# Patient Record
Sex: MALE | Race: WHITE | NOT HISPANIC OR LATINO | Employment: FULL TIME | URBAN - METROPOLITAN AREA
[De-identification: names, ages, dates, MRNs, and addresses within clinical notes are randomized per-mention and may not be internally consistent; named-entity substitution may affect disease eponyms.]

---

## 2017-03-14 ENCOUNTER — HOSPITAL ENCOUNTER (OUTPATIENT)
Dept: RADIOLOGY | Facility: CLINIC | Age: 47
Discharge: HOME | End: 2017-03-14
Attending: PODIATRIST
Payer: OTHER GOVERNMENT

## 2017-03-14 ENCOUNTER — OFFICE VISIT (OUTPATIENT)
Dept: PODIATRY | Facility: CLINIC | Age: 47
End: 2017-03-14
Payer: OTHER GOVERNMENT

## 2017-03-14 VITALS — BODY MASS INDEX: 38.05 KG/M2 | WEIGHT: 228.38 LBS | HEIGHT: 65 IN

## 2017-03-14 DIAGNOSIS — M79.672 FOOT PAIN, LEFT: Primary | ICD-10-CM

## 2017-03-14 DIAGNOSIS — M79.672 FOOT PAIN, LEFT: ICD-10-CM

## 2017-03-14 PROCEDURE — 99999 PR PBB SHADOW E&M-EST. PATIENT-LVL III: CPT | Mod: PBBFAC,,, | Performed by: PODIATRIST

## 2017-03-14 PROCEDURE — 99214 OFFICE O/P EST MOD 30 MIN: CPT | Mod: S$PBB,,, | Performed by: PODIATRIST

## 2017-03-14 PROCEDURE — 99213 OFFICE O/P EST LOW 20 MIN: CPT | Mod: PBBFAC,PO | Performed by: PODIATRIST

## 2017-03-14 PROCEDURE — 73630 X-RAY EXAM OF FOOT: CPT | Mod: TC,PO,LT

## 2017-03-14 PROCEDURE — 73630 X-RAY EXAM OF FOOT: CPT | Mod: 26,LT,S$GLB, | Performed by: RADIOLOGY

## 2017-03-14 NOTE — MR AVS SNAPSHOT
[facility]  [address]  [place] [address]  Phone: [phone]                  Tk Cochran   3/14/2017 11:40 AM   Office Visit    Description:  Male : 1970   Provider:  Valdez Biggs DPM   Department:  [facility]           Reason for Visit     Joint Pain     Foot Swelling           Diagnoses this Visit        Comments    Foot pain, left    -  Primary            To Do List           Future Appointments        Provider Department Dept Phone    3/14/2017 12:30 PM [facility] XR1 [facility] [phone]    3/14/2017 2:45 PM LAB, [facility] Lab [phone]    3/28/2017 2:00 PM Valdez Biggs DPM [facility] [phone]      Goals (5 Years of Data)     None      Follow-Up and Disposition     Return in about 2 weeks (around 3/28/2017).      [facility] On Call     [facility] On Call Nurse [facility] -  Assistance  Registered nurses in the [facility] On Call Center provide clinical advisement, health education, appointment booking, and other advisory services.  Call for this free service at [phone].             Medications           Message regarding Medications     Verify the changes and/or additions to your medication regime listed below are the same as discussed with your clinician today.  If any of these changes or additions are incorrect, please notify your healthcare provider.             Verify that the below list of medications is an accurate representation of the medications you are currently taking.  If none reported, the list may be blank. If incorrect, please contact your healthcare provider. Carry this list with you in case of emergency.           Current Medications     ibuprofen (ADVIL,MOTRIN) 100 MG tablet Take 600 mg by mouth every 6 (six) hours as needed for Temperature greater than.    allopurinol (ZYLOPRIM) 100 MG tablet Take 2 tablets (200 mg total) by mouth once daily.           Clinical Reference Information           Your  "Vitals Were     Height Weight BMI          5' 5" (1.651 m) 103.6 kg (228 lb 6.3 oz) 38.01 kg/m2        Allergies as of 3/14/2017     No Known Allergies      Immunizations Administered on Date of Encounter - 3/14/2017     None      Orders Placed During Today's Visit     Future Labs/Procedures Expected by Expires    C-reactive protein  3/14/2017 5/13/2018    CBC auto differential  3/14/2017 5/13/2018    Sedimentation rate, manual  3/14/2017 5/13/2018    Uric acid  3/14/2017 5/13/2018    X-Ray Foot Complete Left  3/14/2017 3/14/2018      Instructions    Sof Sole Fit Series [place] (found on amazon.com).       Language Assistance Services     ATTENTION: Language assistance services are available, free of charge. Please call [phone].      ATENCIÓN: Si erila maria isabel, tiene a navarro disposición servicios gratuitos de asistencia lingüística. Llame al [phone].     CHÚ Ý: N?u b?n nói Ti?ng Vi?t, có các d?ch v? h? tr? ngôn ng? mi?n phí dành cho b?n. G?i s? [phone].         [facility] complies with applicable Federal civil rights laws and does not discriminate on the basis of race, color, national origin, age, disability, or sex.        "

## 2017-03-14 NOTE — PROGRESS NOTES
Subjective:      Patient ID: Tk Cochran is a 47 y.o. male.    Chief Complaint: Joint Pain (left foot x 1 week   h/o gout ) and Foot Swelling  Patient presents to clinic with the complaint of Lt. Great toe joint pain x 1 week.  Describes pain as aching and rates as a 7/10.  Relates resuming martial arts with a progressive increase in swelling, redness, and pain about the affected joint.  States pain symptoms are exacerbated with physical activity and only partially alleviated with rest.  Has a positive past history of gout, however, states the intensity is nothing compared to previous acute attacks.  Denies definitive trauma to the affected joint.  Has been avoiding chocolate, as this has been a positive trigger in the past.  Denies any additional pedal complaints.       Past Medical History:   Diagnosis Date    Arthritis     gouty, every other month    Gout        Past Surgical History:   Procedure Laterality Date    VASECTOMY      WISDOM TOOTH EXTRACTION         Family History   Problem Relation Age of Onset    Cancer Mother 53     brain cancer    Arthritis Maternal Grandmother        Social History     Social History    Marital status:      Spouse name: N/A    Number of children: N/A    Years of education: N/A     Occupational History     [employer]     Social History Main Topics    Smoking status: Former Smoker     Packs/day: 0.50     Years: 20.00     Types: Cigarettes     Quit date: 8/21/2002    Smokeless tobacco: Never Used    Alcohol use 1.2 oz/week     2 Cans of beer per week      Comment: 2 beer weekly    Drug use: No    Sexual activity: Yes     Partners: Female     Other Topics Concern    None     Social History Narrative       Current Outpatient Prescriptions   Medication Sig Dispense Refill    ibuprofen (ADVIL,MOTRIN) 100 MG tablet Take 600 mg by mouth every 6 (six) hours as needed for Temperature greater than.      allopurinol (ZYLOPRIM) 100 MG tablet Take 2  tablets (200 mg total) by mouth once daily. 60 tablet 3     No current facility-administered medications for this visit.        Review of patient's allergies indicates:  No Known Allergies    Review of Systems   Constitution: Negative for chills and fever.   Cardiovascular: Negative for claudication and leg swelling.   Skin: Positive for color change. Negative for nail changes.   Musculoskeletal: Positive for arthritis, joint pain and joint swelling.   Neurological: Negative for numbness and paresthesias.   Psychiatric/Behavioral: Negative for altered mental status.           Objective:      Physical Exam   Constitutional: He is oriented to person, place, and time. He appears well-developed and well-nourished. No distress.   Cardiovascular:   Pulses:       Dorsalis pedis pulses are 2+ on the right side, and 2+ on the left side.        Posterior tibial pulses are 2+ on the right side, and 2+ on the left side.   CFT <3 seconds bilateral.  Pedal hair growth present bilateral.   No varicosities noted bilateral.  Localized edema noted to the Lt. 1st mtp joint.   Musculoskeletal: He exhibits edema and tenderness.   Muscle strength 5/5 in all muscle groups bilateral.  Pain with active and passive end ROM about the Lt. 1st mtp joint.  Also, pain with palpation of the Lt. Medial eminence and to the sub 1st metatarsal head.  Bilateral pes planus foot type.  Bilateral hallux abducto valgus.     Neurological: He is alert and oriented to person, place, and time. No sensory deficit.   Light touch intact bilateral.      Skin: Skin is warm, dry and intact. No abrasion, no bruising, no burn, no ecchymosis, no laceration, no lesion, no petechiae and no rash noted. He is not diaphoretic. There is erythema. No cyanosis. No pallor. Nails show no clubbing.   Increased pedal turgor and temperature about the Lt. 1st mtp joint.  Erythema noted about the medial eminence.  No adjacent break in skin integrity noted.             Assessment:        Encounter Diagnosis   Name Primary?    Foot pain, left Yes         Plan:       Tk was seen today for joint pain and foot swelling.    Diagnoses and all orders for this visit:    Foot pain, left  -     Uric acid; Future  -     CBC auto differential; Future  -     Sedimentation rate, manual; Future  -     C-reactive protein; Future  -     X-Ray Foot Complete Left; Future      I counseled the patient on his conditions, their implications and medical management.    Advised to begin applying warm compresses to the Lt. Great toe joint.    Advised to begin taking a tart cherry capsule daily.    Continue with current adherence to the gout diet.    Orders written for esr, crp, uric acid, and crp.    Orders written for an x-ray of the Lt. Foot.  No acute signs of trauma noted to the affected extremity.    Will call in colchine 0.6 mg pending lab results.  Advised to discontinue allopurinol as it could exacerbate the current attack.    RTC in 2 weeks for follow up.    Valdez Biggs DPM

## 2017-03-15 ENCOUNTER — TELEPHONE (OUTPATIENT)
Dept: PODIATRY | Facility: CLINIC | Age: 47
End: 2017-03-15

## 2017-03-15 DIAGNOSIS — T56.0X1D LEAD-INDUCED CHRONIC GOUT OF LEFT ANKLE WITHOUT TOPHUS, SUBSEQUENT ENCOUNTER: Chronic | ICD-10-CM

## 2017-03-15 DIAGNOSIS — M10.9 ACUTE GOUT OF LEFT FOOT, UNSPECIFIED CAUSE: Primary | ICD-10-CM

## 2017-03-15 DIAGNOSIS — M1A.1720 LEAD-INDUCED CHRONIC GOUT OF LEFT ANKLE WITHOUT TOPHUS, SUBSEQUENT ENCOUNTER: Chronic | ICD-10-CM

## 2017-03-15 RX ORDER — ALLOPURINOL 100 MG/1
200 TABLET ORAL DAILY
Qty: 60 TABLET | Refills: 3 | OUTPATIENT
Start: 2017-03-15

## 2017-03-15 RX ORDER — COLCHICINE 0.6 MG/1
TABLET ORAL
Qty: 30 TABLET | Refills: 11 | Status: SHIPPED | OUTPATIENT
Start: 2017-03-15

## 2017-03-15 NOTE — TELEPHONE ENCOUNTER
----- Message from Valdez Biggs DPM sent at 3/15/2017  7:07 AM CDT -----  Hey Ladies,    Please call this patient and inform him that labs were positive for gout.  Continue gout diet, tart cherry capsules, warm compresses to the joint, and I will prescribe colchicine.  He should take two capsules when he gets the medication (1.2mg) and then one capsule (0.6mg) every hour following.  He should not exceed 6 capsules (6mg) of colchicine in one day.  He is to take the medication daily until symptoms have resolved.  Thanks!    Dr. Biggs

## 2017-03-15 NOTE — TELEPHONE ENCOUNTER
Per , Spoke with pt, Notified him that his lab work did indicate Gout, instructed pt to con't with Gout diet,Tart Cherry capsules,and warm compresses,Presc. For Colchizine sent to pharm, Instructed pt to take two capsules when he picks up the presc., then one capsule each hour there after, Not to exceed 6 capsules in one day.Pt also instructed to hold off taking the Allopurinol until his Gout symptoms stop, then he can notify office for refill of Allopurinol.Pt verbalized understanding.

## 2017-03-28 ENCOUNTER — OFFICE VISIT (OUTPATIENT)
Dept: PODIATRY | Facility: CLINIC | Age: 47
End: 2017-03-28
Payer: OTHER GOVERNMENT

## 2017-03-28 ENCOUNTER — LAB VISIT (OUTPATIENT)
Dept: LAB | Facility: HOSPITAL | Age: 47
End: 2017-03-28
Attending: PODIATRIST
Payer: OTHER GOVERNMENT

## 2017-03-28 VITALS — WEIGHT: 223.31 LBS | BODY MASS INDEX: 37.2 KG/M2 | HEIGHT: 65 IN

## 2017-03-28 DIAGNOSIS — M10.9 ACUTE GOUT OF LEFT FOOT, UNSPECIFIED CAUSE: ICD-10-CM

## 2017-03-28 DIAGNOSIS — M10.9 ACUTE GOUT OF LEFT FOOT, UNSPECIFIED CAUSE: Primary | ICD-10-CM

## 2017-03-28 LAB
ALBUMIN SERPL BCP-MCNC: 3.8 G/DL
ALP SERPL-CCNC: 80 U/L
ALT SERPL W/O P-5'-P-CCNC: 46 U/L
ANION GAP SERPL CALC-SCNC: 9 MMOL/L
AST SERPL-CCNC: 29 U/L
BILIRUB SERPL-MCNC: 0.6 MG/DL
BUN SERPL-MCNC: 8 MG/DL
CALCIUM SERPL-MCNC: 9.2 MG/DL
CHLORIDE SERPL-SCNC: 103 MMOL/L
CO2 SERPL-SCNC: 26 MMOL/L
CREAT SERPL-MCNC: 1.1 MG/DL
EST. GFR  (AFRICAN AMERICAN): >60 ML/MIN/1.73 M^2
EST. GFR  (NON AFRICAN AMERICAN): >60 ML/MIN/1.73 M^2
GLUCOSE SERPL-MCNC: 83 MG/DL
POTASSIUM SERPL-SCNC: 3.7 MMOL/L
PROT SERPL-MCNC: 7.4 G/DL
SODIUM SERPL-SCNC: 138 MMOL/L
URATE SERPL-MCNC: 9 MG/DL

## 2017-03-28 PROCEDURE — 99212 OFFICE O/P EST SF 10 MIN: CPT | Mod: PBBFAC,PO | Performed by: PODIATRIST

## 2017-03-28 PROCEDURE — 84550 ASSAY OF BLOOD/URIC ACID: CPT

## 2017-03-28 PROCEDURE — 99213 OFFICE O/P EST LOW 20 MIN: CPT | Mod: S$PBB,,, | Performed by: PODIATRIST

## 2017-03-28 PROCEDURE — 99999 PR PBB SHADOW E&M-EST. PATIENT-LVL II: CPT | Mod: PBBFAC,,, | Performed by: PODIATRIST

## 2017-03-28 PROCEDURE — 36415 COLL VENOUS BLD VENIPUNCTURE: CPT | Mod: PO

## 2017-03-28 PROCEDURE — 80053 COMPREHEN METABOLIC PANEL: CPT

## 2017-03-28 NOTE — PROGRESS NOTES
Subjective:      Patient ID: Tk Cochran is a 47 y.o. male.    Chief Complaint: Foot Pain (left foot pain , Gout)  Patient presents to clinic for a two week follow up for an acute gout attack of the Lt. 1st mtp joint.  Denies pain to the previously affected extremity.  Relates a small amount of tenderness with end ROM about the joint, however, he is able to ambulate and perform his activities of daily living without issue.  Relates a total revamping of his diet with elimination of fast foot and red meat.  Relates that he is no longer taking colchicine, as he is feeling well.  Inquires as to a maintenance medication to prevent future attacks.  Denies any additional pedal complaints.       Past Medical History:   Diagnosis Date    Arthritis     gouty, every other month    Gout        Past Surgical History:   Procedure Laterality Date    VASECTOMY      WISDOM TOOTH EXTRACTION         Family History   Problem Relation Age of Onset    Cancer Mother 53     brain cancer    Arthritis Maternal Grandmother        Social History     Social History    Marital status:      Spouse name: N/A    Number of children: N/A    Years of education: N/A     Occupational History     [employer]     Social History Main Topics    Smoking status: Former Smoker     Packs/day: 0.50     Years: 20.00     Types: Cigarettes     Quit date: 8/21/2002    Smokeless tobacco: Never Used    Alcohol use 1.2 oz/week     2 Cans of beer per week      Comment: 2 beer weekly    Drug use: No    Sexual activity: Yes     Partners: Female     Other Topics Concern    None     Social History Narrative       Current Outpatient Prescriptions   Medication Sig Dispense Refill    colchicine 0.6 mg tablet Take two capsules initially.  Then take one capsule every hour.  Do not exceed six capsules in a 24 hour period. 30 tablet 11    ibuprofen (ADVIL,MOTRIN) 100 MG tablet Take 600 mg by mouth every 6 (six) hours as needed for  Temperature greater than.      allopurinol (ZYLOPRIM) 100 MG tablet Take 2 tablets (200 mg total) by mouth once daily. 60 tablet 3     No current facility-administered medications for this visit.        Review of patient's allergies indicates:  No Known Allergies    Review of Systems   Constitution: Negative for chills and fever.   Cardiovascular: Negative for claudication and leg swelling.   Skin: Negative for color change and nail changes.   Musculoskeletal: Positive for arthritis and joint pain. Negative for joint swelling.   Neurological: Negative for numbness and paresthesias.   Psychiatric/Behavioral: Negative for altered mental status.           Objective:      Physical Exam   Constitutional: He is oriented to person, place, and time. He appears well-developed and well-nourished. No distress.   Cardiovascular:   Pulses:       Dorsalis pedis pulses are 2+ on the right side, and 2+ on the left side.        Posterior tibial pulses are 2+ on the right side, and 2+ on the left side.   CFT <3 seconds bilateral.  Pedal hair growth present bilateral.   No varicosities noted bilateral.  Toes are warm to touch bilateral.     Musculoskeletal: He exhibits no edema or tenderness.   Muscle strength 5/5 in all muscle groups bilateral.  Mild tenderness with dorsiflexory end ROM at the Lt. 1st mtp joint.  Bilateral pes planus foot type.  Bilateral hallux abducto valgus.     Neurological: He is alert and oriented to person, place, and time. No sensory deficit.   Light touch intact bilateral.      Skin: Skin is warm, dry and intact. No abrasion, no bruising, no burn, no ecchymosis, no laceration, no lesion, no petechiae and no rash noted. He is not diaphoretic. No cyanosis or erythema. No pallor. Nails show no clubbing.   Pedal skin has normal turgor, temperature, and texture bilateral.  Toenails x 10 appear normotrophic. Examination of the skin reveals no evidence of significant maceration, rashes, open lesions, suspicious  appearing nevi or other concerning lesions.              Assessment:       Encounter Diagnosis   Name Primary?    Acute gout of left foot, unspecified cause Yes         Plan:       Tk was seen today for foot pain.    Diagnoses and all orders for this visit:    Acute gout of left foot, unspecified cause  -     Uric acid; Future  -     COMPREHENSIVE METABOLIC PANEL; Future      I counseled the patient on his conditions, their implications and medical management.    Advised to continue wearing current insoles to address flat foot deformity.    Orders written for a new uric acid and CMP, prior to prescribing allopurinol 300mg.    Advised to continue taking a tart cherry capsule daily.    Continue with current adherence to the gout diet.    May resume normal physical activity to tolerance.    RTC prn.    Valdez Biggs DPM

## 2017-03-29 ENCOUNTER — TELEPHONE (OUTPATIENT)
Dept: PODIATRY | Facility: CLINIC | Age: 47
End: 2017-03-29

## 2017-03-29 DIAGNOSIS — M10.9 ACUTE GOUT OF LEFT FOOT, UNSPECIFIED CAUSE: Primary | ICD-10-CM

## 2017-03-29 RX ORDER — METHYLPREDNISOLONE 4 MG/1
TABLET ORAL
Qty: 1 PACKAGE | Refills: 0 | Status: SHIPPED | OUTPATIENT
Start: 2017-03-29 | End: 2017-04-19

## 2017-03-29 NOTE — TELEPHONE ENCOUNTER
Spoke with pt , asking if he has to take Medrol dose pk if he is not experiencing pain, Advised pt per  that he did not have to take med, it was prescribed to give pt relief if needed, Pt verbalized understanding.

## 2017-03-29 NOTE — TELEPHONE ENCOUNTER
----- Message from Esther Olivera sent at 3/29/2017 11:08 AM CDT -----  Please call patient in regards to directions for methylPREDNISolone (MEDROL DOSEPACK) 4 mg tablet [phone] (home)

## 2017-03-29 NOTE — TELEPHONE ENCOUNTER
----- Message from Valdez Biggs DPM sent at 3/29/2017  7:21 AM CDT -----  Hey Ladies,    Please contact Mr. Frey, and let him know that his uric acid is still elevated.  Also, one of his liver enzymes was elevated.  Tell him to discontinue colchicine for the time being.  I will send him in a medrol dose pack to help conclude the current attack.  I will reorder blood work to assess his liver in 1 month.  Hopefully we can begin either allopurinol or febuxostat once the liver enzymes are normal.  Thanks!    Dr. Biggs

## 2017-03-29 NOTE — TELEPHONE ENCOUNTER
Per , notified pt of lab results and change in med orders, encouraged pt to stop the Colchicine and to  presc for new med, informed pt that he will repeat lab work in a month, and  will reassess his lab work and order meds according to lab results, pt verbalized understanding.

## 2017-04-25 ENCOUNTER — PATIENT MESSAGE (OUTPATIENT)
Dept: PODIATRY | Facility: CLINIC | Age: 47
End: 2017-04-25

## 2017-04-27 ENCOUNTER — LAB VISIT (OUTPATIENT)
Dept: LAB | Facility: HOSPITAL | Age: 47
End: 2017-04-27
Attending: PODIATRIST
Payer: OTHER GOVERNMENT

## 2017-04-27 DIAGNOSIS — M10.9 ACUTE GOUT OF LEFT FOOT, UNSPECIFIED CAUSE: ICD-10-CM

## 2017-04-27 DIAGNOSIS — M10.9 ACUTE GOUT OF LEFT FOOT, UNSPECIFIED CAUSE: Primary | ICD-10-CM

## 2017-04-27 LAB
ALBUMIN SERPL BCP-MCNC: 3.7 G/DL
ALP SERPL-CCNC: 89 U/L
ALT SERPL W/O P-5'-P-CCNC: 27 U/L
AST SERPL-CCNC: 20 U/L
BILIRUB DIRECT SERPL-MCNC: 0.3 MG/DL
BILIRUB SERPL-MCNC: 0.9 MG/DL
PROT SERPL-MCNC: 7.7 G/DL

## 2017-04-27 PROCEDURE — 80076 HEPATIC FUNCTION PANEL: CPT

## 2017-04-27 PROCEDURE — 36415 COLL VENOUS BLD VENIPUNCTURE: CPT | Mod: PO

## 2017-08-28 ENCOUNTER — TELEPHONE (OUTPATIENT)
Dept: FAMILY MEDICINE | Facility: CLINIC | Age: 47
End: 2017-08-28

## 2017-08-28 NOTE — TELEPHONE ENCOUNTER
----- Message from Felecia Rouse sent at 8/28/2017  8:58 AM CDT -----  Contact: wife-Eliza  Needs refill on allopurinol (ZYLOPRIM) 100 MG tablet.  Patient has moved to [place] and hasn't found a doctor yet.  Please call back at     [address]--[phone]

## 2017-08-28 NOTE — TELEPHONE ENCOUNTER
Received message from patient's wife requesting a refill of Allopurinol. Patient's last office visit with Dr. Ty noted on 9-9-15. Office appointment would be required to refill this medication. Message states patient has moved to [place] but has not established with a doctor in that area. Call placed to patient for notification that refill can not be approved at this time. No answer received. Left message.